# Patient Record
Sex: MALE | Race: WHITE | NOT HISPANIC OR LATINO | URBAN - METROPOLITAN AREA
[De-identification: names, ages, dates, MRNs, and addresses within clinical notes are randomized per-mention and may not be internally consistent; named-entity substitution may affect disease eponyms.]

---

## 2017-03-29 ENCOUNTER — OUTPATIENT (OUTPATIENT)
Dept: OUTPATIENT SERVICES | Facility: HOSPITAL | Age: 47
LOS: 1 days | End: 2017-03-29
Payer: COMMERCIAL

## 2017-03-29 ENCOUNTER — RESULT REVIEW (OUTPATIENT)
Age: 47
End: 2017-03-29

## 2017-03-29 ENCOUNTER — APPOINTMENT (OUTPATIENT)
Dept: SURGERY | Facility: CLINIC | Age: 47
End: 2017-03-29

## 2017-03-29 VITALS
HEIGHT: 72 IN | HEART RATE: 85 BPM | SYSTOLIC BLOOD PRESSURE: 136 MMHG | RESPIRATION RATE: 12 BRPM | OXYGEN SATURATION: 99 % | TEMPERATURE: 99 F | WEIGHT: 199.96 LBS | DIASTOLIC BLOOD PRESSURE: 88 MMHG

## 2017-03-29 VITALS
RESPIRATION RATE: 16 BRPM | OXYGEN SATURATION: 98 % | BODY MASS INDEX: 25.67 KG/M2 | HEIGHT: 74 IN | HEART RATE: 91 BPM | WEIGHT: 200 LBS | DIASTOLIC BLOOD PRESSURE: 91 MMHG | SYSTOLIC BLOOD PRESSURE: 134 MMHG | TEMPERATURE: 98.5 F

## 2017-03-29 DIAGNOSIS — Z98.890 OTHER SPECIFIED POSTPROCEDURAL STATES: Chronic | ICD-10-CM

## 2017-03-29 DIAGNOSIS — K64.4 RESIDUAL HEMORRHOIDAL SKIN TAGS: ICD-10-CM

## 2017-03-29 DIAGNOSIS — Z78.9 OTHER SPECIFIED HEALTH STATUS: ICD-10-CM

## 2017-03-29 DIAGNOSIS — K08.499 PARTIAL LOSS OF TEETH DUE TO OTHER SPECIFIED CAUSE, UNSPECIFIED CLASS: Chronic | ICD-10-CM

## 2017-03-29 PROCEDURE — G0463: CPT

## 2017-03-29 RX ORDER — TAMSULOSIN HYDROCHLORIDE 0.4 MG/1
0.4 CAPSULE ORAL
Qty: 30 | Refills: 3 | Status: ACTIVE | COMMUNITY
Start: 2017-03-29 | End: 1900-01-01

## 2017-03-29 RX ORDER — OXYCODONE HYDROCHLORIDE 5 MG/1
10 TABLET ORAL ONCE
Qty: 0 | Refills: 0 | Status: DISCONTINUED | OUTPATIENT
Start: 2017-03-30 | End: 2017-03-30

## 2017-03-29 RX ORDER — LIDOCAINE HCL 20 MG/ML
0.2 VIAL (ML) INJECTION ONCE
Qty: 0 | Refills: 0 | Status: DISCONTINUED | OUTPATIENT
Start: 2017-03-30 | End: 2017-04-14

## 2017-03-29 RX ORDER — SODIUM CHLORIDE 9 MG/ML
3 INJECTION INTRAMUSCULAR; INTRAVENOUS; SUBCUTANEOUS EVERY 8 HOURS
Qty: 0 | Refills: 0 | Status: DISCONTINUED | OUTPATIENT
Start: 2017-03-30 | End: 2017-04-14

## 2017-03-29 RX ORDER — ONDANSETRON 8 MG/1
4 TABLET, FILM COATED ORAL ONCE
Qty: 0 | Refills: 0 | Status: COMPLETED | OUTPATIENT
Start: 2017-03-30 | End: 2017-03-30

## 2017-03-29 RX ORDER — SIMVASTATIN 40 MG/1
40 TABLET, FILM COATED ORAL
Refills: 0 | Status: ACTIVE | COMMUNITY

## 2017-03-29 RX ORDER — CELECOXIB 200 MG/1
200 CAPSULE ORAL ONCE
Qty: 0 | Refills: 0 | Status: DISCONTINUED | OUTPATIENT
Start: 2017-03-30 | End: 2017-04-14

## 2017-03-29 RX ORDER — PRAMOXINE HYDROCHLORIDE AND HYDROCORTISONE ACETATE 10; 25 MG/G; MG/G
2.5-1 CREAM TOPICAL
Qty: 30 | Refills: 0 | Status: DISCONTINUED | COMMUNITY
Start: 2016-01-12

## 2017-03-29 RX ORDER — SODIUM CHLORIDE 9 MG/ML
1000 INJECTION, SOLUTION INTRAVENOUS
Qty: 0 | Refills: 0 | Status: DISCONTINUED | OUTPATIENT
Start: 2017-03-30 | End: 2017-03-30

## 2017-03-29 RX ORDER — SIMVASTATIN 40 MG/1
40 TABLET, FILM COATED ORAL
Qty: 30 | Refills: 0 | Status: DISCONTINUED | COMMUNITY
Start: 2017-01-31

## 2017-03-29 RX ORDER — ACETAMINOPHEN 500 MG
975 TABLET ORAL ONCE
Qty: 0 | Refills: 0 | Status: COMPLETED | OUTPATIENT
Start: 2017-03-30 | End: 2017-03-30

## 2017-03-29 NOTE — H&P PST ADULT - NSANTHOSAYNRD_GEN_A_CORE
No. BETO screening performed.  STOP BANG Legend: 0-2 = LOW Risk; 3-4 = INTERMEDIATE Risk; 5-8 = HIGH Risk

## 2017-03-30 ENCOUNTER — OUTPATIENT (OUTPATIENT)
Dept: OUTPATIENT SERVICES | Facility: HOSPITAL | Age: 47
LOS: 1 days | End: 2017-03-30
Payer: COMMERCIAL

## 2017-03-30 ENCOUNTER — APPOINTMENT (OUTPATIENT)
Dept: SURGERY | Facility: HOSPITAL | Age: 47
End: 2017-03-30

## 2017-03-30 ENCOUNTER — EMERGENCY (EMERGENCY)
Facility: HOSPITAL | Age: 47
LOS: 1 days | Discharge: HOME | End: 2017-03-30
Attending: EMERGENCY MEDICINE | Admitting: EMERGENCY MEDICINE
Payer: COMMERCIAL

## 2017-03-30 ENCOUNTER — OTHER (OUTPATIENT)
Age: 47
End: 2017-03-30

## 2017-03-30 VITALS
OXYGEN SATURATION: 99 % | DIASTOLIC BLOOD PRESSURE: 83 MMHG | RESPIRATION RATE: 20 BRPM | TEMPERATURE: 98 F | HEART RATE: 90 BPM | SYSTOLIC BLOOD PRESSURE: 144 MMHG

## 2017-03-30 VITALS
OXYGEN SATURATION: 99 % | HEIGHT: 72 IN | WEIGHT: 199.96 LBS | HEART RATE: 89 BPM | SYSTOLIC BLOOD PRESSURE: 144 MMHG | RESPIRATION RATE: 12 BRPM | TEMPERATURE: 99 F | DIASTOLIC BLOOD PRESSURE: 88 MMHG

## 2017-03-30 VITALS
OXYGEN SATURATION: 96 % | DIASTOLIC BLOOD PRESSURE: 80 MMHG | HEART RATE: 74 BPM | RESPIRATION RATE: 16 BRPM | SYSTOLIC BLOOD PRESSURE: 126 MMHG

## 2017-03-30 VITALS
SYSTOLIC BLOOD PRESSURE: 134 MMHG | DIASTOLIC BLOOD PRESSURE: 68 MMHG | OXYGEN SATURATION: 100 % | HEART RATE: 77 BPM | RESPIRATION RATE: 15 BRPM

## 2017-03-30 DIAGNOSIS — Z98.890 OTHER SPECIFIED POSTPROCEDURAL STATES: Chronic | ICD-10-CM

## 2017-03-30 DIAGNOSIS — R33.9 RETENTION OF URINE, UNSPECIFIED: ICD-10-CM

## 2017-03-30 DIAGNOSIS — K64.4 RESIDUAL HEMORRHOIDAL SKIN TAGS: ICD-10-CM

## 2017-03-30 DIAGNOSIS — K08.499 PARTIAL LOSS OF TEETH DUE TO OTHER SPECIFIED CAUSE, UNSPECIFIED CLASS: Chronic | ICD-10-CM

## 2017-03-30 LAB
APPEARANCE UR: CLEAR — SIGNIFICANT CHANGE UP
BILIRUB UR-MCNC: NEGATIVE — SIGNIFICANT CHANGE UP
COLOR SPEC: SIGNIFICANT CHANGE UP
DIFF PNL FLD: NEGATIVE — SIGNIFICANT CHANGE UP
GLUCOSE UR QL: NEGATIVE — SIGNIFICANT CHANGE UP
KETONES UR-MCNC: ABNORMAL
LEUKOCYTE ESTERASE UR-ACNC: NEGATIVE — SIGNIFICANT CHANGE UP
NITRITE UR-MCNC: NEGATIVE — SIGNIFICANT CHANGE UP
PH UR: 6 — SIGNIFICANT CHANGE UP (ref 4.8–8)
PROT UR-MCNC: NEGATIVE — SIGNIFICANT CHANGE UP
SP GR SPEC: 1.01 — LOW (ref 1.01–1.02)
UROBILINOGEN FLD QL: NEGATIVE — SIGNIFICANT CHANGE UP

## 2017-03-30 PROCEDURE — 51702 INSERT TEMP BLADDER CATH: CPT

## 2017-03-30 PROCEDURE — 99283 EMERGENCY DEPT VISIT LOW MDM: CPT | Mod: 25

## 2017-03-30 PROCEDURE — 88304 TISSUE EXAM BY PATHOLOGIST: CPT | Mod: 26

## 2017-03-30 PROCEDURE — 88304 TISSUE EXAM BY PATHOLOGIST: CPT

## 2017-03-30 PROCEDURE — 46260 REMOVE IN/EX HEM GROUPS 2+: CPT

## 2017-03-30 PROCEDURE — 87086 URINE CULTURE/COLONY COUNT: CPT

## 2017-03-30 PROCEDURE — C1889: CPT

## 2017-03-30 PROCEDURE — 81003 URINALYSIS AUTO W/O SCOPE: CPT

## 2017-03-30 RX ORDER — SODIUM CHLORIDE 9 MG/ML
1000 INJECTION, SOLUTION INTRAVENOUS ONCE
Qty: 0 | Refills: 0 | Status: DISCONTINUED | OUTPATIENT
Start: 2017-03-30 | End: 2017-04-14

## 2017-03-30 RX ORDER — DOCUSATE SODIUM 100 MG
1 CAPSULE ORAL
Qty: 30 | Refills: 0 | OUTPATIENT
Start: 2017-03-30 | End: 2017-04-14

## 2017-03-30 RX ORDER — HYDROMORPHONE HYDROCHLORIDE 2 MG/ML
1 INJECTION INTRAMUSCULAR; INTRAVENOUS; SUBCUTANEOUS
Qty: 0 | Refills: 0 | COMMUNITY

## 2017-03-30 RX ORDER — TAMSULOSIN HYDROCHLORIDE 0.4 MG/1
1 CAPSULE ORAL
Qty: 0 | Refills: 0 | COMMUNITY

## 2017-03-30 RX ORDER — ACETAMINOPHEN 500 MG
1000 TABLET ORAL ONCE
Qty: 0 | Refills: 0 | Status: DISCONTINUED | OUTPATIENT
Start: 2017-03-30 | End: 2017-04-14

## 2017-03-30 RX ORDER — SODIUM CHLORIDE 9 MG/ML
1000 INJECTION, SOLUTION INTRAVENOUS
Qty: 0 | Refills: 0 | Status: DISCONTINUED | OUTPATIENT
Start: 2017-03-30 | End: 2017-04-14

## 2017-03-30 RX ORDER — TAMSULOSIN HYDROCHLORIDE 0.4 MG/1
0.4 CAPSULE ORAL ONCE
Qty: 0 | Refills: 0 | Status: COMPLETED | OUTPATIENT
Start: 2017-03-30 | End: 2017-03-30

## 2017-03-30 RX ORDER — FAMOTIDINE 10 MG/ML
1 INJECTION INTRAVENOUS
Qty: 0 | Refills: 0 | COMMUNITY

## 2017-03-30 RX ORDER — SENNA PLUS 8.6 MG/1
1 TABLET ORAL
Qty: 30 | Refills: 0 | OUTPATIENT
Start: 2017-03-30 | End: 2017-04-14

## 2017-03-30 RX ORDER — CELECOXIB 200 MG/1
200 CAPSULE ORAL ONCE
Qty: 0 | Refills: 0 | Status: COMPLETED | OUTPATIENT
Start: 2017-03-30 | End: 2017-03-30

## 2017-03-30 RX ORDER — SIMVASTATIN 20 MG/1
1 TABLET, FILM COATED ORAL
Qty: 0 | Refills: 0 | COMMUNITY

## 2017-03-30 RX ADMIN — TAMSULOSIN HYDROCHLORIDE 0.4 MILLIGRAM(S): 0.4 CAPSULE ORAL at 15:00

## 2017-03-30 RX ADMIN — OXYCODONE HYDROCHLORIDE 10 MILLIGRAM(S): 5 TABLET ORAL at 11:22

## 2017-03-30 RX ADMIN — CELECOXIB 200 MILLIGRAM(S): 200 CAPSULE ORAL at 09:33

## 2017-03-30 RX ADMIN — ONDANSETRON 4 MILLIGRAM(S): 8 TABLET, FILM COATED ORAL at 11:23

## 2017-03-30 RX ADMIN — Medication 975 MILLIGRAM(S): at 09:32

## 2017-03-30 NOTE — ED ADULT NURSE NOTE - OBJECTIVE STATEMENT
47y male pt, recent hemorrhoidectomy, arrived to ED c/o urinary retention for about 12hrs. No fever/chills, no nausea/vomit/diarrhea. Pt states that he was offered to have Dunlap Catheter inserted after surgery but he declined.

## 2017-03-30 NOTE — ED PROVIDER NOTE - MEDICAL DECISION MAKING DETAILS
Attending MD Joseph: 47M s/p hemorrhoidectomy early the day of presentation presenting with urinary retention, orourke placed with 1.5L drained, will check UA to r/o concurrent UTI. Encouraged ongoing bowel regiment and close f/u with urology and his primary surgeon to determined when voiding trial will be attempted

## 2017-03-30 NOTE — ED PROVIDER NOTE - NS ED MD SCRIBE ATTENDING SCRIBE SECTIONS
PHYSICAL EXAM/PAST MEDICAL/SURGICAL/SOCIAL HISTORY/HIV/REVIEW OF SYSTEMS/HISTORY OF PRESENT ILLNESS/OBSERVATION MONITORING PLAN/VITAL SIGNS( Pullset)

## 2017-03-30 NOTE — ASU DISCHARGE PLAN (ADULT/PEDIATRIC). - ITEMS TO FOLLOWUP WITH YOUR PHYSICIAN'S
Please follow up with Dr. Hoffman in 2 weeks after discharge. Please follow up with Dr. Villalta in 2 weeks after discharge.

## 2017-03-30 NOTE — ASU DISCHARGE PLAN (ADULT/PEDIATRIC). - SPECIAL INSTRUCTIONS
Please see instruction sheet for postoperative care information. Please see instruction sheet for postoperative care information.  Patient instructed that if he does not void by 9 pm tonight that he should return to the [facility] ED for orourke catheter placement.

## 2017-03-30 NOTE — ED PROVIDER NOTE - CARE PLAN
Principal Discharge DX:	Urinary retention Principal Discharge DX:	Urinary retention  Instructions for follow-up, activity and diet:	You were seen in the ED after being unable to urinate, your bladder was drained with a  catheter. The catheter needs to stay in for at least 24-48 hours.     Please call your surgeon to arrange a follow up appointment in 2-3 days to have the catheter removed. You may also follow up with our urologists in 2-3 days (number provided) if you cannot have your surgeon arrange the appointment.     Return to the ED for worsening pain, fevers (temperature over 101F), blood in the urine or sediment in the urine

## 2017-03-30 NOTE — ASU DISCHARGE PLAN (ADULT/PEDIATRIC). - MEDICATION SUMMARY - MEDICATIONS TO TAKE
I will START or STAY ON the medications listed below when I get home from the hospital:    Zocor 40 mg oral tablet  -- 1 tab(s) by mouth once a day (at bedtime)  -- Indication: For home med    famotidine 20 mg oral tablet  -- 1 tab(s) by mouth once a day pm and am of surgery  -- Indication: For  home med

## 2017-03-30 NOTE — ED PROVIDER NOTE - GASTROINTESTINAL, MLM
Abdomen soft, moderate distension with palpable bladder suprapubically non-tender, no guarding. Rectal exam: Chaperone Tamera Lyon. Dressing in place with no strike through dressing

## 2017-03-30 NOTE — ED PROVIDER NOTE - OBJECTIVE STATEMENT
48 y/o male with PMHx of Hemorrhoids and HLD, s/p Hemorrhoidectomy today presents to the ED c/o urinary retention since today at 8am. Reports he was able to urinate in small amounts postop and was advised to have a catheter placed, however he denied at that time. Reports he has took 3 tabs of Dilaudid 2mg. Denies fever, chills, n/v/d. Surgeon: Dr. Miquel Hoffman ([phone]).

## 2017-03-30 NOTE — ED PROVIDER NOTE - GASTROINTESTINAL NEGATIVE STATEMENT, MLM
Hpi Title: Evaluation of Skin Lesions Have Your Spot(S) Been Treated In The Past?: has not been treated no abdominal pain, no bloating, no constipation, no diarrhea, no nausea and no vomiting.

## 2017-03-30 NOTE — ED ADULT NURSE REASSESSMENT NOTE - NS ED NURSE REASSESS COMMENT FT1
Dunlap Catheter inserted using sterile technique with 1RN and 1MD present. Clear and yellow urine noted.
Total 1600ml of urine output noted. MD aware, Pt reports relief of pain/distress. Wife at bedside.
Dunlap changed to leg bag as per MD. Pt was educated how to empty leg bag and returned demonstration.

## 2017-03-30 NOTE — ED PROVIDER NOTE - PLAN OF CARE
You were seen in the ED after being unable to urinate, your bladder was drained with a  catheter. The catheter needs to stay in for at least 24-48 hours.     Please call your surgeon to arrange a follow up appointment in 2-3 days to have the catheter removed. You may also follow up with our urologists in 2-3 days (number provided) if you cannot have your surgeon arrange the appointment.     Return to the ED for worsening pain, fevers (temperature over 101F), blood in the urine or sediment in the urine

## 2017-03-30 NOTE — ASU DISCHARGE PLAN (ADULT/PEDIATRIC). - NOTIFY
Persistent Nausea and Vomiting/Swelling that continues/Bleeding that does not stop/Pain not relieved by Medications

## 2017-04-01 LAB
CULTURE RESULTS: NO GROWTH — SIGNIFICANT CHANGE UP
SPECIMEN SOURCE: SIGNIFICANT CHANGE UP

## 2017-04-03 LAB — SURGICAL PATHOLOGY STUDY: SIGNIFICANT CHANGE UP

## 2017-04-04 ENCOUNTER — TRANSCRIPTION ENCOUNTER (OUTPATIENT)
Age: 47
End: 2017-04-04

## 2017-04-11 ENCOUNTER — APPOINTMENT (OUTPATIENT)
Dept: SURGERY | Facility: CLINIC | Age: 47
End: 2017-04-11

## 2017-04-11 VITALS
DIASTOLIC BLOOD PRESSURE: 58 MMHG | OXYGEN SATURATION: 97 % | TEMPERATURE: 100.5 F | SYSTOLIC BLOOD PRESSURE: 92 MMHG | RESPIRATION RATE: 15 BRPM | HEART RATE: 95 BPM

## 2017-04-11 DIAGNOSIS — N39.0 URINARY TRACT INFECTION, SITE NOT SPECIFIED: ICD-10-CM

## 2017-04-11 RX ORDER — HYDROMORPHONE HYDROCHLORIDE 2 MG/1
2 TABLET ORAL
Qty: 40 | Refills: 0 | Status: DISCONTINUED | COMMUNITY
Start: 2017-03-29 | End: 2017-04-11

## 2017-04-11 RX ORDER — ACETAMINOPHEN 325 MG/1
TABLET, FILM COATED ORAL
Refills: 0 | Status: ACTIVE | COMMUNITY

## 2017-04-13 RX ORDER — SULFAMETHOXAZOLE AND TRIMETHOPRIM 400; 80 MG/1; MG/1
400-80 TABLET ORAL TWICE DAILY
Qty: 14 | Refills: 0 | Status: ACTIVE | COMMUNITY
Start: 2017-04-13 | End: 1900-01-01

## 2017-04-14 ENCOUNTER — APPOINTMENT (OUTPATIENT)
Dept: SURGERY | Facility: CLINIC | Age: 47
End: 2017-04-14

## 2017-04-14 VITALS
RESPIRATION RATE: 16 BRPM | TEMPERATURE: 98.9 F | OXYGEN SATURATION: 97 % | HEART RATE: 91 BPM | DIASTOLIC BLOOD PRESSURE: 77 MMHG | SYSTOLIC BLOOD PRESSURE: 110 MMHG

## 2017-04-14 DIAGNOSIS — Z98.890 OTHER SPECIFIED POSTPROCEDURAL STATES: ICD-10-CM

## 2017-04-14 DIAGNOSIS — K62.89 OTHER SPECIFIED DISEASES OF ANUS AND RECTUM: ICD-10-CM

## 2017-04-14 DIAGNOSIS — R50.82 POSTPROCEDURAL FEVER: ICD-10-CM

## 2017-04-14 DIAGNOSIS — K64.4 RESIDUAL HEMORRHOIDAL SKIN TAGS: ICD-10-CM

## 2017-04-14 LAB — BACTERIA UR CULT: ABNORMAL

## 2017-04-14 RX ORDER — CIPROFLOXACIN HYDROCHLORIDE 500 MG/1
500 TABLET, FILM COATED ORAL
Qty: 20 | Refills: 3 | Status: DISCONTINUED | COMMUNITY
Start: 2017-04-11 | End: 2017-04-14

## 2017-04-14 RX ORDER — METRONIDAZOLE 250 MG/1
250 TABLET ORAL EVERY 6 HOURS
Qty: 40 | Refills: 3 | Status: DISCONTINUED | COMMUNITY
Start: 2017-04-11 | End: 2017-04-14

## 2017-04-28 ENCOUNTER — APPOINTMENT (OUTPATIENT)
Dept: SURGERY | Facility: CLINIC | Age: 47
End: 2017-04-28

## 2018-07-30 NOTE — H&P PST ADULT - NS NEC GEN PE MLT EXAM PC
Physical Therapy Daily Treatment     Visit Count: 2  Plan of Care Dates:  Initial: 7/19/2018 Through: 10/11/2018  Insurance Information: [employer], $40 copay, document and bill G codes, no visit limits  Next Referring Provider Visit: Shahram 9/20/18     Referred by: Ruddy Omalley MD  Medical Diagnosis (from order): R42 Vertigo  Z74.09 Impaired functional mobility, balance, and endurance, Deconditioning, Weakness  Treatment Diagnosis: Dizziness and Balance deficits, Deconditioning, Weakness with  Pain, Impaired Gait/Locomotion Deficits, Impaired Mobility and Impaired Balance  Insurance: 1. [employer] MEDICARE  2. N/A     Date of Onset: chronic    Diagnosis Precautions: Fall Risk, Syncope  Chart reviewed: Relevant co-morbidities, allergies, tests and medications: PMH includes (but not limited to): Syncope (last one 1 month ago), Seizures,  HTN, Diabetes (not well controlled per pt due to cost of medication), Diabetic Neuropathy (bilateral LE - feet to knees numbness),  R foot reconstruction (2016)   5/1/18:  CT Head:  IMPRESSION: Negative CT study of the head.  12/12/17:  XR Lumbar Spine:  IMPRESSION: Mild, multilevel disc degenerative changes are present.       SUBJECTIVE   Dizzy 2-3/10 (after nap this afternoon, laid on left and rolled onto back)  Patient identified two goals: being able to just throw a ball to grandson and being able to tie his own shoes as his wife currently has to help him put his shoes on.  Current Pain: 4-5/10.  because of neuropathy tingling in feet into calves and feet  Functional Change: None    OBJECTIVE       Strength: Lower Extremities    Left Right Position   Date 9/30/18 9/30/18    HIP          Flexion  4+ knee ext lag 4+ knee ext lag supine     Extension 4  4  prone     IR/ER  4+/4+ 4/4 sit     Glut med 4 4 sie     Abduction 4 ** 4 ** side   KNEE     sit     Flexion  5  4+      Extension  5 5     ANKLE          Dorsiflexion 5  4     PF 5 3+    EHL 5 0    [standard testing positions  unless otherwise noted]  Comments: ** unable to completely attain true abd position * denotes pain    Range of Motion (degrees)   Norm Left Right   Date  9/30/18 9/30/18   Ankle Dorsiflexion 20° +4 -3   HS 90/90  -60° -55°   Knee extension  -5 -22   Comments: Only those motions that were assessed are noted.      BP for orthostatic: 7/30/18    Supine: 122/74   Sit 118/70   Stand 130/74    Treatment   Therapeutic Activity:  LE strength and ROM assessed and initiated HEP based on findings (positional had to wait for dizziness to subside each time patient changed positions sit to supine and roll)    BP for orthostatic    Supine: 122/74   Sit 118/70   Stand 130/74     Therapeutic Exercise: practiced with patient and spouse (present throughout session)  · Seated HS stretch - caution with right foot ( in DF increased pain in right foot along) holds 30 seconds at end end 5 seconds of heel pressing into floor  · Piriformis stretch IR with belt and foot on thigh holds 30 seconds  · Seated knee extension stretch leg on mat x 30 seconds  · Spouse performs ROM - discussed KTC as a low back exercise not as a knee ROM       Current Home Program (not performed this date except as noted above):   7/30/18:  Seated HS stretch, Seated knee extension, Piriformis stretch IR    ASSESSMENT   Patient did not present with any signs of orthostatic hypotension however transitions during strength testing (sit to supine to roll) he did experience dizziness with change of position which would warrant r/o positional testing for vestibular involvement with future skilled PT appointment    Pain after treatment: Forgot to ask /10  Result of above outlined education: Verbalizes understanding    Goals:       To be obtained by end of this plan of care:  1. Patient independent with modified and progressed home exercise program.  2. Patient will report a gameplan for if he were to fall to get off the floor and notifying for help to improve his feeling  safe in his home.  3. Patient will report at least a 50% improvement symptoms with ability to look up to rinse hair in shower.  4. Patient will report at least a 50% improvement in symptoms in looking down to read his book  5. Patient will report a decrease in symptoms with walking through his home.  6. Dizziness Handicap Inventory: Patient will complete form to reflect an improved score from initial score of 44 to less than or equal to 26 (scored 0-100, higher score indicates higher impairment) to indicate pt reported improvement in function/disability/impairment (minimal detectable change: 17 points).     PLAN   Give senior resource information, vestibular positional testing, balance/gait assessment for progressive HEP     THERAPY DAILY BILLING   Primary Insurance:  [employer] MEDICARE  Secondary Insurance: N/A    Evaluation Procedures:  No evaluation codes were used on this date of service    Timed Procedures:  Therapeutic Activity, 42 minutes  Therapeutic Exercise, 13 minutes    Untimed Procedures:  No untimed codes were used on this date of service    Total Treatment Time: 55 minutes    G-Code:  G-Code Score ABN form  Eval/Re-eval: report current and goal status with C modifiers   : Current Mobility Limitation,  CM - 80% to 99% impaired, limited or restricted  : Goal Mobility Limitation,  CL - 60% to 79% impaired, limited or restricted  Modifier based on outcome measure(s)/functional testing/clinical judgement as listed above     The referring provider's electronic or written signature on the evaluation authorizes the therapy plan of care and certifies the need for these services, furnished under this plan of care while under their care.    Referring provider signature on file      No bruits; no thyromegaly or nodules

## 2020-12-15 PROBLEM — N39.0 URINARY TRACT INFECTION, ACUTE: Status: RESOLVED | Noted: 2017-04-13 | Resolved: 2020-12-15

## 2022-12-05 NOTE — H&P PST ADULT - HEALTH CARE MAINTENANCE
[Dear  ___] : Dear  [unfilled], [Consult Letter:] : I had the pleasure of evaluating your patient, [unfilled]. [Please see my note below.] : Please see my note below. [Consult Closing:] : Thank you very much for allowing me to participate in the care of this patient.  If you have any questions, please do not hesitate to contact me. [Sincerely,] : Sincerely, [FreeTextEntry3] : Linn Lyons NJayP.\par  neg flu vaccine